# Patient Record
Sex: FEMALE | Race: WHITE
[De-identification: names, ages, dates, MRNs, and addresses within clinical notes are randomized per-mention and may not be internally consistent; named-entity substitution may affect disease eponyms.]

---

## 2020-10-12 ENCOUNTER — HOSPITAL ENCOUNTER (EMERGENCY)
Dept: HOSPITAL 74 - FER | Age: 60
Discharge: HOME | End: 2020-10-12
Payer: SELF-PAY

## 2020-10-12 VITALS — SYSTOLIC BLOOD PRESSURE: 126 MMHG | DIASTOLIC BLOOD PRESSURE: 63 MMHG | HEART RATE: 97 BPM

## 2020-10-12 VITALS — BODY MASS INDEX: 18.3 KG/M2

## 2020-10-12 DIAGNOSIS — R09.89: Primary | ICD-10-CM

## 2020-10-12 NOTE — PDOC
History of Present Illness





- General


Chief Complaint: Choking Sensation


Stated Complaint: ATE SOUP YESTERDAY FEELS DISCOMFORT


Time Seen by Provider: 10/12/20 03:57


History Source: Patient


Exam Limitations: No Limitations





- History of Present Illness


Initial Comments: 





59 yo F no significant PMH presents with lower chest pain since last night. She 

states she ate vegetable soup, felt like something became dislodged in her 

esophagus. She states she is having discomfort. Denies regurgitation, nausea, 

vomiting. She has been able to tolerate liquids and solids without difficulty. 








Past History





- Medical History


Allergies/Adverse Reactions: 


                                    Allergies











Allergy/AdvReac Type Severity Reaction Status Date / Time


 


No Known Allergies Allergy   Verified 10/12/20 03:54











Home Medications: 


Ambulatory Orders





NK [No Known Home Medication]  10/12/20 








COPD: No


Other medical history: DENIES





- Psycho-Social/Smoking History


Smoking History: Never smoked


Have you smoked in the past 12 months: No


Information on smoking cessation initiated: No





- Substance Abuse Hx (Audit-C & DAST Scrn)


How often the patient has a drink containing alcohol: Never


Score: In Men: 4 or > Positive; In Women: 3 or > Positive: 0


Screen Result (Pos requires Nsg. Audit-10AR): Negative


In the last yr the pt used illegal drug/Rx for NonMed reason: No


Score:  Yes response is considered Positive: 0


Screen Result (Positive result requires Nsg. DAST-10): Negative





**Review of Systems





- Review of Systems


Able to Perform ROS?: Yes


Comments:: 





GENERAL/CONSTITUTIONAL: No fever or chills. No weakness.


HEAD, EYES, EARS, NOSE AND THROAT: No change in vision. No ear pain or 

discharge. No sore throat.


CARDIOVASCULAR: +Lower chest pain, no shortness of breath.


RESPIRATORY: No cough, wheezing, or hemoptysis.


GASTROINTESTINAL: No nausea, vomiting, diarrhea or constipation.


GENITOURINARY: No dysuria, frequency, or change in urination.


MUSCULOSKELETAL: No joint or muscle swelling or pain. No neck or back pain.


SKIN: No rash.


NEUROLOGIC: No headache, vertigo, loss of consciousness, or change in 

strength/sensation.


ENDOCRINE: No increased thirst. No abnormal weight change.


HEMATOLOGIC/LYMPHATIC: No anemia, easy bleeding, or history of blood clots.


ALLERGIC/IMMUNOLOGIC: No hives or skin allergy. 








*Physical Exam





- Vital Signs


                                Last Vital Signs











Temp Pulse Resp BP Pulse Ox


 


    97 H  18   126/63   100 


 


    10/12/20 03:59  10/12/20 03:59  10/12/20 03:59  10/12/20 03:59














- Physical Exam





GENERAL: Awake, alert, and fully oriented, in no acute distress


HEAD: No signs of trauma


EYES: PERRLA, EOMI, sclera anicteric, conjunctiva clear


ENT: Auricles normal inspection, hearing grossly normal, nares patent, 

oropharynx clear without exudates. Moist mucosa


NECK: Normal ROM, supple, no lymphadenopathy, JVD, or masses


LUNGS: Breath sounds equal, clear to auscultation bilaterally. No wheezes, and 

no crackles


HEART: Regular rate and rhythm, normal S1 and S2, no murmurs, rubs or gallops


ABDOMEN: Soft, nontender, normoactive bowel sounds. No guarding, no rebound. No 

masses


EXTREMITIES: Normal range of motion, no edema. No clubbing or cyanosis. No 

cords, erythema, or tenderness


NEUROLOGICAL: Cranial nerves II through XII grossly intact. Normal speech, 

normal gait. Motor and sensation intact


SKIN: Warm, dry, normal turgor, no rashes or lesions noted.











**Heart Score/ECG Review





- ECG Impressions


Comment:: 





EKG read 04:18- NSR 87 bpm, no acute ST/T changes








Medical Decision Making





- Medical Decision Making





10/12/20 04:28


Pt presents with concerns that she has an impacted food bolus in her esophagus, 

as she has a history of not completely chewing her food. However, patient has 

been able to swallow liquids and solids without difficulty, indicating that 

there is not an impaction, as solid foods would not be able to pass by. In 

addition, she only has the sensation at the base of her chest, she did not feel 

anything in her throat or higher in her chest prior to the symptoms. There are 

other possibilities such as esophageal spasm, hiatal hernia. She also may have 

discomfort from swallowing a large food bolus, however, less likely as the pain 

is only lower. EKG was done, as this may be an atypical presentation of ACS, 

however, EKG was normal. Patient has no cardiac risk factors. Patient was 

concerned that maybe there was a foreign object in one of the vegetables that 

would cause an obstruction, however, she has no obstructive symptoms, and would 

expect such an object to be large enough that it would be difficult to swallow 

without noticing. Vegetables were medium cut as per patient. Stable for DC home.













Discharge





- Discharge Information


Problems reviewed: Yes


Clinical Impression/Diagnosis: 


 Sensation of foreign body in esophagus





Condition: Stable


Disposition: HOME





- Admission


No





- Follow up/Referral


Referrals: 


[facility] [Provider Group]





- Patient Discharge Instructions


Patient Printed Discharge Instructions:  DI for Atypical Chest Pain


Additional Instructions: 


If you have any vomiting, difficulty swallowing, chest pain, or difficulty 

breathing, return to the ER immediately. 





- Post Discharge Activity

## 2020-10-12 NOTE — EKG
Test Reason : 

Blood Pressure : ***/*** mmHG

Vent. Rate : 087 BPM     Atrial Rate : 087 BPM

   P-R Int : 132 ms          QRS Dur : 072 ms

    QT Int : 362 ms       P-R-T Axes : 074 080 059 degrees

   QTc Int : 435 ms

 

NORMAL SINUS RHYTHM

NORMAL ECG

NO PREVIOUS ECGS AVAILABLE

Confirmed by AKOSUA MENDOZA MD ([id number]) on 10/12/2020 11:51:53 AM

 

Referred By:             Confirmed By:AKOSUA MENDOZA MD